# Patient Record
Sex: MALE | Race: WHITE | NOT HISPANIC OR LATINO | URBAN - METROPOLITAN AREA
[De-identification: names, ages, dates, MRNs, and addresses within clinical notes are randomized per-mention and may not be internally consistent; named-entity substitution may affect disease eponyms.]

---

## 2020-03-27 ENCOUNTER — TELEPHONE (OUTPATIENT)
Dept: NEUROSURGERY | Facility: CLINIC | Age: 55
End: 2020-03-27

## 2020-03-27 NOTE — TELEPHONE ENCOUNTER
Vicky from [employer] called requesting medical records-she states this is her second request.    LINDA in chart.    Vicky is working from home, so if you need to speak to her, call: [phone] and leave message for [email or web address]    Fax # [phone]-please make sure signed barcode sheet is on top when faxing.